# Patient Record
Sex: FEMALE | Race: WHITE
[De-identification: names, ages, dates, MRNs, and addresses within clinical notes are randomized per-mention and may not be internally consistent; named-entity substitution may affect disease eponyms.]

---

## 2017-10-18 NOTE — EDM.PDOC
ED HPI GENERAL MEDICAL PROBLEM





- General


Chief Complaint: ENT Problem


Stated Complaint: R EAR PAIN


Time Seen by Provider: 10/18/17 18:08


Source of Information: Reports: Patient, Family, Old Records, RN Notes Reviewed


History Limitations: Reports: No Limitations





- History of Present Illness


INITIAL COMMENTS - FREE TEXT/NARRATIVE: 





22-year-old female presents emergency department today complaint of right ear 

pain states the pain has been going on for about 1 week or so has had fevers I 

was evaluated by her primary care provider yesterday started on amoxicillin as 

well as Ciprodex she has taken about 5 doses of amoxicillin but has not started 

the Ciprodex secondary to cost. She feels the pain in her ear has not improved 

she continues to have discomfort swelling over the side of her face sore throat 

difficulty opening her jaw no nausea or vomiting no shortness of breath or 

chest pain,


  ** Right Face


Pain Score (Numeric/FACES): 9





- Related Data


 Allergies











Allergy/AdvReac Type Severity Reaction Status Date / Time


 


No Known Allergies Allergy   Verified 10/18/17 17:59











Home Meds: 


 Home Meds





Amoxicillin 500 mg PO TID 10/18/17 [History]











Past Medical History


Gastrointestinal History: Reports: Cholelithiasis


OB/GYN History: Reports: Pregnancy


Neurological History: Reports: Other (See Below)


Other Neuro History: "anxiety induced seizures"


Psychiatric History: Reports: Anxiety





- Past Surgical History


GI Surgical History: Reports: Cholecystectomy





Social & Family History





- Tobacco Use


Smoking Status *Q: Never Smoker





- Caffeine Use


Caffeine Use: Reports: Soda





- Recreational Drug Use


Recreational Drug Use: No





ED ROS ENT





- Review of Systems


Review Of Systems: See Below


Constitutional: Reports: Fever, Chills, Weakness


HEENT: Reports: Ear Discharge, Ear Pain


Respiratory: Reports: No Symptoms


Cardiovascular: Reports: No Symptoms


GI/Abdominal: Reports: No Symptoms


: Reports: No Symptoms


Musculoskeletal: Reports: No Symptoms


Skin: Reports: No Symptoms


Neurological: Reports: No Symptoms





ED EXAM, ENT





- Physical Exam


Exam: See Below


Text/Narrative:: 





Examination of the right ear the canal is edematous and erythematous I cannot 

appreciate the membrane secondary to edema, left tympanic membranes clear and 

gray


Exam Limited By: No Limitations


General Appearance: Alert, WD/WN, No Apparent Distress


Mouth/Throat: Normal Inspection, Normal Gums, Normal Lips, Normal Oropharynx, 

Normal Teeth


Head: Atraumatic, Normocephalic


Neck: Normal Inspection, Supple, Full Range of Motion, Other (Tender to 

palpation along the anterior cervical chain bilaterally however I cannot 

palpate any nodes)


Respiratory/Chest: No Respiratory Distress, Lungs Clear, Normal Breath Sounds, 

No Accessory Muscle Use


Cardiovascular: Regular Rate, Rhythm, No Murmur





Course





- Vital Signs


Last Recorded V/S: 


 Last Vital Signs











Temp  99.1 F   10/18/17 17:55


 


Pulse  98   10/18/17 17:55


 


Resp  16   10/18/17 17:55


 


BP  143/78 H  10/18/17 17:55


 


Pulse Ox  98   10/18/17 17:55














- Orders/Labs/Meds


Labs: 


 Laboratory Tests











  10/18/17 10/18/17 10/18/17 Range/Units





  18:23 18:23 18:23 


 


WBC  9.4    (4.5-11.0)  K/uL


 


RBC  4.66    (3.30-5.50)  M/uL


 


Hgb  13.4    (12.0-15.0)  g/dL


 


Hct  40.4    (36.0-48.0)  %


 


MCV  87    (80-98)  fL


 


MCH  29    (27-31)  pg


 


MCHC  33    (32-36)  %


 


Plt Count  225    (150-400)  K/uL


 


Neut % (Auto)  71 H    (36-66)  %


 


Lymph % (Auto)  19 L    (24-44)  %


 


Mono % (Auto)  9 H    (2-6)  %


 


Eos % (Auto)  2    (2-4)  %


 


Baso % (Auto)  0    (0-1)  %


 


Sodium   140   (140-148)  mmol/L


 


Potassium   3.9   (3.6-5.2)  mmol/L


 


Chloride   104   (100-108)  mmol/L


 


Carbon Dioxide   27   (21-32)  mmol/L


 


Anion Gap   9.0   (5.0-14.0)  mmol/L


 


BUN   10   (7-18)  mg/dL


 


Creatinine   0.8   (0.6-1.0)  mg/dL


 


Est Cr Clr Drug Dosing   89.70   mL/min


 


Estimated GFR (MDRD)   > 60   (>60)  


 


Glucose   87   ()  mg/dL


 


Lactic Acid    1.3  (0.4-2.0)  mmol/L


 


Calcium   8.7   (8.5-10.1)  mg/dL


 


Total Bilirubin   0.4   (0.2-1.0)  mg/dL


 


AST   12 L   (15-37)  U/L


 


ALT   21   (12-78)  U/L


 


Alkaline Phosphatase   113   ()  U/L


 


Total Protein   7.7   (6.4-8.2)  g/dL


 


Albumin   3.9   (3.4-5.0)  g/dL


 


Globulin   3.8 H   (2.3-3.5)  g/dL


 


Albumin/Globulin Ratio   1.0 L   (1.2-2.2)  











Meds: 


Medications














Discontinued Medications














Generic Name Dose Route Start Last Admin





  Trade Name Boris  PRN Reason Stop Dose Admin


 


Ceftriaxone Sodium 1 gm/  0 gm  10/18/17 19:09  10/18/17 19:48





  Lidocaine HCl 2.1 ml  IM  10/18/17 19:10  1 inj





  ONETIME ONE   Administration


 


Ketorolac Tromethamine  30 mg  10/18/17 19:02  10/18/17 19:51





  Toradol  IM  10/18/17 19:03  30 mg





  ONETIME ONE   Administration


 


Lidocaine  4 ml  10/18/17 18:14  





  Lta 360 Kit Top Soln  TOP  10/18/17 18:15  





  ONETIME ONE   


 


Lidocaine  50 ml  10/18/17 18:20  10/18/17 18:25





  Lta 360 Kit Top Soln  TOP  10/18/17 18:21  2 ml





  ONETIME ONE   Administration


 


Lidocaine HCl  Confirm  10/18/17 18:24  





  Xylocaine 4% Top Soln  Administered  10/18/17 18:25  





  Dose   





  50 ml   





  .ROUTE   





  .STK-MED ONE   














Departure





- Departure


Time of Disposition: 20:55


Disposition: Home, Self-Care 01


Condition: Good


Clinical Impression: 


Otitis externa


Qualifiers:


 Otitis externa type: diffuse Chronicity: acute Laterality: right Qualified Code

(s): H60.311 - Diffuse otitis externa, right ear








- Discharge Information


Referrals: 


PCP,None [Primary Care Provider] - 


Forms:  ED Department Discharge


Additional Instructions: 


Use ibuprofen for baseline pain control, use hydrocodone for breakthrough pain,

  Please followup with your primary care provider in 3-5 days if not better, 

please call return to the emergency department with worsening of symptoms.





- Assessment/Plan


Plan: 





Assessment





Acuity = acute





Site and laterality = right otitis externa  





Etiology  = probable bacterial cause





Manifestations = pain





Location of injury =  Home





Lab values = CBC, CMP, lactic acid all within normal limits





Plan


She was provided 1 g Rocephin also lidocaine eardrops did not provide any 

relief 1 mg Dilaudid IM did provide significant relief she'll be discharged 

with hydrocodone 5/325 one tablet by mouth 3 times a day when necessary total #

10 she will follow-up with primary care in 3-5 days if no improvement

















Patient was in agreement with the plan all questions were answered, they were 

instructed to return to the emergency department or call for worsening 

symptoms.  This note was dictated using dragon voice recognition software 

please call with any questions.

## 2020-05-22 ENCOUNTER — HOSPITAL ENCOUNTER (EMERGENCY)
Dept: HOSPITAL 11 - JP.ED | Age: 25
LOS: 1 days | Discharge: HOME | End: 2020-05-23
Payer: SELF-PAY
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
## 2020-05-22 DIAGNOSIS — S03.40XA: Primary | ICD-10-CM

## 2020-05-22 PROCEDURE — 99283 EMERGENCY DEPT VISIT LOW MDM: CPT

## 2020-05-22 PROCEDURE — 96372 THER/PROPH/DIAG INJ SC/IM: CPT

## 2020-05-23 NOTE — EDM.PDOC
ED HPI GENERAL MEDICAL PROBLEM





- General


Chief Complaint: ENT Problem


Stated Complaint: JAW PAIN


Time Seen by Provider: 05/23/20 00:05


Source of Information: Reports: Patient


History Limitations: Reports: Physical Impairment (difficulty speaking due to 

limited range of motion of jaw)





- History of Present Illness


INITIAL COMMENTS - FREE TEXT/NARRATIVE: 





chief complaint: jaw pain





This is a 24 year old female who lives in [place], here visiting relative 

this weekend. drove 6 hours to get here and now jaw is so painful unable to 

open. She reports two months ago had dental xray and procedure. Jaw has been 

painful and difficulty with open and closing. She has appointment next Thursday 

with Primary Care Provider for referral to Oral Surgeon. She is here for pain 

control.





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


denies any use of drugs or alcohol 


denies any addictions or misuse of drugs.


Onset: Today


Duration: Getting Worse


Location: Reports: Face (bilateral jaw pain, right TMJ more painful than the 

left TMJ)


Quality: Reports: Ache, Sharp


Severity: Severe (unable to open or close mouth.)


Improves with: Reports: Rest


Worsens with: Reports: Eating, Movement


Context: Reports: Other (complications of dental procedure)


Associated Symptoms: Reports: No Other Symptoms


Treatments PTA: Reports: Acetaminophen, NSAIDS, Other Medication(s) (flexeril)


  ** Right Upper Jaw


Pain Score (Numeric/FACES): 9





- Related Data


 Allergies











Allergy/AdvReac Type Severity Reaction Status Date / Time


 


No Known Allergies Allergy   Verified 05/22/20 23:58











Home Meds: 


 Home Meds





Cyclobenzaprine [Flexeril] 10 mg PO DAILY 05/22/20 [History]











Past Medical History


HEENT History: Reports: Other (See Below)


Other HEENT History: TMJ


Gastrointestinal History: Reports: Cholelithiasis


OB/GYN History: Reports: Pregnancy


Neurological History: Reports: Other (See Below)


Other Neuro History: "anxiety induced seizures"


Psychiatric History: Reports: Anxiety





- Past Surgical History


GI Surgical History: Reports: Cholecystectomy





Social & Family History





- Tobacco Use


Smoking Status *Q: Never Smoker





- Caffeine Use


Caffeine Use: Reports: Soda





- Recreational Drug Use


Recreational Drug Use: No





- Living Situation & Occupation


Occupation: Employed (Health Care Worker, lives in [place].)





ED ROS ENT





- Review of Systems


Review Of Systems: See Below


Constitutional: Reports: Other (bilateral jaw)


HEENT: Reports: Dental Pain, Other (TMJ pain., jaw locked)


Respiratory: Reports: No Symptoms


Cardiovascular: Reports: No Symptoms


Musculoskeletal: Reports: Joint Pain (bilateral jaw, TMJ joint bilateral), 

Joint Swelling (right TMJ), Muscle Pain (bilateral jaw), Muscle Stiffness (

bilateral jaw)


Skin: Reports: No Symptoms


Neurological: Reports: No Symptoms





ED EXAM, ENT





- Physical Exam


Exam: See Below


Exam Limited By: No Limitations


General Appearance: Alert, WD/WN, Moderate Distress


Ears: Normal External Exam


Nose: Normal Inspection


Mouth/Throat: Normal Teeth (upper and lower front teeth are white, clean and 

without caries), Other (unable to open mouth due to TMJ impairment. mild edema 

noted to the right TMJ.  normal voice. )


Head: Atraumatic, Normocephalic, Facial Tenderness (bilateral TMJ)


Neck: Normal Inspection, Supple, Non-Tender, Full Range of Motion


Respiratory/Chest: No Respiratory Distress, Lungs Clear, Normal Breath Sounds, 

No Accessory Muscle Use, Chest Non-Tender


Cardiovascular: Normal Peripheral Pulses, Regular Rate, Rhythm, No Edema, No 

Gallop, No JVD, No Murmur, No Rub


Back: Normal Inspection, Full Range of Motion


Neurological: Alert, Oriented, Normal Cognition


Psychiatric: Normal Affect, Normal Mood


Skin: Warm, Dry, Intact, Normal Color


Lymphatic: No Adenopathy





Course





- Vital Signs


Last Recorded V/S: 


 Last Vital Signs











Temp  36.4 C   05/22/20 23:56


 


Pulse  79   05/22/20 23:56


 


Resp  16   05/22/20 23:56


 


BP  137/87   05/22/20 23:56


 


Pulse Ox  97   05/22/20 23:56














- Orders/Labs/Meds


Meds: 


Medications














Discontinued Medications














Generic Name Dose Route Start Last Admin





  Trade Name Boris  PRN Reason Stop Dose Admin


 


Ketorolac Tromethamine  60 mg  05/23/20 00:21  05/23/20 00:30





  Toradol  IM  05/23/20 00:22  60 mg





  ONETIME ONE   Administration





     





     





     





     














- Re-Assessments/Exams


Free Text/Narrative Re-Assessment/Exam: 





05/23/20 00:39


will give Toradol 60 mg IM, [facility] Norco #10 tabs, Prednisone taper dose #30


advised to follow up with Primary Care Provider as scheduled 5-


return to ER or Urgent Care if has any concerns or symptoms worse. 


Ms. Grider agrees with plan of care.





Departure





- Departure


Time of Disposition: 00:23


Disposition: Home, Self-Care 01


Condition: Good


Clinical Impression: 


TMJ (sprain of temporomandibular joint)


Qualifiers:


 Encounter type: initial encounter Qualified Code(s): S03.40XA - Sprain of jaw, 

unspecified side, initial encounter








- Discharge Information


*PRESCRIPTION DRUG MONITORING PROGRAM REVIEWED*: Not Applicable


*COPY OF PRESCRIPTION DRUG MONITORING REPORT IN PATIENT STARR: Not Applicable


Referrals: 


PCP,None [Primary Care Provider] - 


Forms:  ED Department Discharge


Care Plan Goals: 


TMJ joint sprain


-given Toradol 60 mg IM in ER]


-[facility] Norco 5-325mg one every 4 to 6 hours as needed for pain #10 tablets


-[facility] Prednisone tablet as directed #30 tablets


-continue Motrin, Flexeril as directed


-apply warm compress to area of pain for 20 minutes every 2 hours as needed


-soft diet, avoid chewy foods


-follow next week with Primary Care Provider on May 28, 2020





return to ER or Urgent Care if not improved or has any concerns.








Sepsis Event Note





- Evaluation


Sepsis Screening Result: No Definite Risk





- Focused Exam


Vital Signs: 


 Vital Signs











  Temp Pulse Resp BP Pulse Ox


 


 05/22/20 23:56  36.4 C  79  16  137/87  97


 


 05/22/20 23:52  36.4 C  79  16  137/87  97











Date Exam was Performed: 05/23/20


Time Exam was Performed: 00:34





- Problem List & Annotations


(1) TMJ (sprain of temporomandibular joint)


SNOMED Code(s): 91954856


   Code(s): S03.40XA - SPRAIN OF JAW, UNSPECIFIED SIDE, INITIAL ENCOUNTER   

Status: Acute   Priority: High   Current Visit: Yes   


Qualifiers: 


   Encounter type: initial encounter   Qualified Code(s): S03.40XA - Sprain of 

jaw, unspecified side, initial encounter   





- Problem List Review


Problem List Initiated/Reviewed/Updated: Yes





- Assessment/Plan


Plan: 


TMJ joint sprain


-given Toradol 60 mg IM in ER]


-[facility] Norco 5-325mg one every 4 to 6 hours as needed for pain #10 tablets


-[facility] Prednisone tablet as directed #30 tablets


-continue Motrin, Flexeril as directed


-apply warm compress to area of pain for 20 minutes every 2 hours as needed


-soft diet, avoid chewy foods


-follow next week with Primary Care Provider on May 28, 2020





return to ER or Urgent Care if not improved or has any concerns.